# Patient Record
Sex: FEMALE | Race: WHITE | NOT HISPANIC OR LATINO | Employment: OTHER | URBAN - METROPOLITAN AREA
[De-identification: names, ages, dates, MRNs, and addresses within clinical notes are randomized per-mention and may not be internally consistent; named-entity substitution may affect disease eponyms.]

---

## 2019-06-12 ENCOUNTER — OFFICE VISIT - HEALTHEAST (OUTPATIENT)
Dept: PODIATRY | Facility: CLINIC | Age: 74
End: 2019-06-12

## 2019-06-12 DIAGNOSIS — L60.0 INGROWN TOENAIL: ICD-10-CM

## 2019-06-12 DIAGNOSIS — B35.1 NAIL FUNGUS: ICD-10-CM

## 2019-06-12 DIAGNOSIS — L60.2 ONYCHAUXIS: ICD-10-CM

## 2019-06-12 ASSESSMENT — MIFFLIN-ST. JEOR: SCORE: 1053.34

## 2021-05-25 ENCOUNTER — RECORDS - HEALTHEAST (OUTPATIENT)
Dept: ADMINISTRATIVE | Facility: CLINIC | Age: 76
End: 2021-05-25

## 2021-05-29 NOTE — PROGRESS NOTES
FOOT AND ANKLE SURGERY/PODIATRY CONSULT NOTE         ASSESSMENT:   Onychocryptosis second toe left foot  Onychomycosis  Onychauxis      TREATMENT:  Debrided the second toenail left foot        HPI: Carley Malloy is a pleasant 73-year-old female who presented to the clinic today complaining of a very painful ingrown toenail involving the outer portion of her second toenail left foot.  The patient stated this is quite tender when wearing shoes.  It is a sharp pain.  She has not had any trauma to the toe.  She has not had any redness or swelling.  There are no factors which relieve her pain.  She denies any other previous treatment.     History reviewed. No pertinent past medical history.    History reviewed. No pertinent surgical history.    Allergies   Allergen Reactions     Levofloxacin Hives     Hives also in mouth     Baclofen Nausea Only       No current outpatient medications on file.    History reviewed. No pertinent family history.    Social History     Socioeconomic History     Marital status:      Spouse name: Not on file     Number of children: Not on file     Years of education: Not on file     Highest education level: Not on file   Occupational History     Not on file   Social Needs     Financial resource strain: Not on file     Food insecurity:     Worry: Not on file     Inability: Not on file     Transportation needs:     Medical: Not on file     Non-medical: Not on file   Tobacco Use     Smoking status: Current Some Day Smoker     Types: Cigarettes     Smokeless tobacco: Never Used   Substance and Sexual Activity     Alcohol use: Yes     Frequency: 4 or more times a week     Drinks per session: 1 or 2     Binge frequency: Daily or almost daily     Drug use: Never     Sexual activity: Not on file   Lifestyle     Physical activity:     Days per week: Not on file     Minutes per session: Not on file     Stress: Not on file   Relationships     Social connections:     Talks on phone: Not on  file     Gets together: Not on file     Attends Latter day service: Not on file     Active member of club or organization: Not on file     Attends meetings of clubs or organizations: Not on file     Relationship status: Not on file     Intimate partner violence:     Fear of current or ex partner: Not on file     Emotionally abused: Not on file     Physically abused: Not on file     Forced sexual activity: Not on file   Other Topics Concern     Not on file   Social History Narrative     Not on file       Review of Systems - Patient denies fever, chills, rash, wound, stiffness, limping, numbness, weakness, heart burn, blood in stool, chest pain with activity, calf pain when walking, shortness of breath with activity, chronic cough, easy bleeding/bruising, swelling of ankles, excessive thirst, fatigue, depression, anxiety.  Patient admits to painful ingrown toenail second toe left foot.      OBJECTIVE:  Appearance: alert, well appearing, and in no distress.    Vitals:    06/12/19 1046   BP: 148/65   Pulse: 76   Resp: 18   Temp: 97.6  F (36.4  C)       BMI= Body mass index is 20.63 kg/m .    General appearance: Patient is alert and fully cooperative with history & exam.  No sign of distress is noted during the visit.  Psychiatric: Affect is pleasant & appropriate.  Patient appears motivated to improve health.  Respiratory: Breathing is regular & unlabored while sitting.  HEENT: Hearing is intact to spoken word.  Speech is clear.  No gross evidence of visual impairment that would impact ambulation.    Vascular: Dorsalis pedis and posterior tibial pulses are palpable. There is no is pedal hair growth bilaterally.  CFT < 3 sec from anterior tibial surface to distal digits bilaterally. There is no appreciable edema noted.  Dermatologic: Nails are normal length this date.  The lateral border of the second toenail left foot is severely incurvated.  There is pain on palpation along the lateral margin of the second toenail left  foot.  Turgor and texture are within normal limits. No coloration or temperature changes. No primary or secondary lesions noted.  Neurologic: All epicritic and proprioceptive sensations are grossly intact bilaterally.  Musculoskeletal: All active and passive ankle, subtalar, midtarsal, and 1st MPJ range of motion are grossly intact without pain or crepitus, with the exception of none. Manual muscle strength is normal limits bilaterally All dorsiflexors, plantarflexors, invertors, evertors are intact bilaterally. Tenderness present to lateral border left great toenail second toe on palpation.  No tenderness to second toe left foot with range of motion. Calf is soft/non-tender without warmth/induration    Imaging:     No results found.       TREATMENT:  Debrided the second toenail left foot and remove the offending nail border.  The patient is to return to the clinic as needed.    Kb Winters; LISA  [facility] Foot & Ankle Surgery/Podiatry

## 2021-06-02 VITALS — BODY MASS INDEX: 20.66 KG/M2 | WEIGHT: 124 LBS | HEIGHT: 65 IN

## 2022-03-23 ENCOUNTER — HOSPITAL ENCOUNTER (EMERGENCY)
Facility: CLINIC | Age: 77
Discharge: HOME | End: 2022-03-23
Attending: EMERGENCY MEDICINE | Admitting: EMERGENCY MEDICINE
Payer: COMMERCIAL

## 2022-03-23 VITALS
WEIGHT: 110.2 LBS | DIASTOLIC BLOOD PRESSURE: 56 MMHG | TEMPERATURE: 97.9 F | HEIGHT: 65 IN | OXYGEN SATURATION: 94 % | RESPIRATION RATE: 20 BRPM | SYSTOLIC BLOOD PRESSURE: 98 MMHG | HEART RATE: 74 BPM | BODY MASS INDEX: 18.36 KG/M2

## 2022-03-23 DIAGNOSIS — S81.812A SKIN TEAR OF LEFT LOWER LEG WITHOUT COMPLICATION, INITIAL ENCOUNTER: ICD-10-CM

## 2022-03-23 PROCEDURE — 99283 EMERGENCY DEPT VISIT LOW MDM: CPT

## 2022-03-23 PROCEDURE — 12002 RPR S/N/AX/GEN/TRNK2.6-7.5CM: CPT

## 2022-03-23 ASSESSMENT — ENCOUNTER SYMPTOMS
WEAKNESS: 0
FEVER: 0
FATIGUE: 0
CHILLS: 0
WOUND: 1

## 2022-03-24 NOTE — ED TRIAGE NOTES
Presents to the ED with C/O L lower shin laceration that occurred this morning around 0830 when pt ran into the  door     Unknown last tdap   Bleeding controlled

## 2022-03-24 NOTE — ED PROVIDER NOTES
EMERGENCY DEPARTMENT ENCOUNTER      NAME: Carley Malloy  AGE: 76 year old female  YOB: 1945  MRN: [id number]  EVALUATION DATE & TIME: 3/23/2022  8:57 PM    PCP: Darrel Cristobal    ED PROVIDER: Jeimy Riley DO      Chief Complaint   Patient presents with     Laceration         FINAL IMPRESSION:  1. Skin tear of left lower leg without complication, initial encounter          ED COURSE & MEDICAL DECISION MAKING:    Pertinent Labs & Imaging studies reviewed. (See chart for details)  9:11 PM I met the patient and performed my initial interview and exam. PPE: Provider wore gloves, and paper mask.    9:44 PM I rechecked and updated the patient. I discussed the plan for discharge with the patient, and patient is agreeable. We discussed supportive cares at home and reasons for return to the ER including new or worsening symptoms - all questions and concerns addressed. Patient to be discharged by RN.     76 year old female presents to the Emergency Department for evaluation of laceration of the left lower extremity.  Ports release morning, the  was down and she struck her leg.  She has been ambulatory.  Looked at the wound tonight and was concerned she may need stitches.  No bony pain.  Her last tetanus was in 2020.  The laceration is a skin tear with very friable skin, not amendable to stitching.  It was approximated with Steri-Strips per the resident physician.  Fairly good approximation.  Did discuss symptomatic care and return precautions.      At the conclusion of the encounter I discussed the results of all of the tests and the disposition. The questions were answered. The patient or family acknowledged understanding and was agreeable with the care plan.     MEDICATIONS GIVEN IN THE EMERGENCY:  Medications - No data to display    NEW PRESCRIPTIONS STARTED AT TODAY'S ER VISIT  There are no discharge medications for this patient.          =================================================================    HPI    Patient information was obtained from: the patient     Use of : N/A         Carley Malloy is a 76 year old female with no recorded pertinent history who presents to this ED via walk in for evaluation of a leg laceration.     The patient reports that this morning she buried her . She was walking around this morning after having a few glasses of wine, when she ran into the open  door around 0830 (~13 hours ago). This created a laceration on her left shin which has been wrapped since this morning.     Per Chart Review:   The patient's last tetanus shot was on 7/30/2020.    REVIEW OF SYSTEMS   Review of Systems   Constitutional: Negative for chills, fatigue and fever.   Skin: Positive for wound (laceration to left shin).   Allergic/Immunologic: Negative for immunocompromised state.   Neurological: Negative for syncope and weakness.        PAST MEDICAL HISTORY:  History reviewed. No pertinent past medical history.    PAST SURGICAL HISTORY:  History reviewed. No pertinent surgical history.        CURRENT MEDICATIONS:    No current outpatient medications on file.       ALLERGIES:  Allergies   Allergen Reactions     Levofloxacin Hives     Hives also in mouth     Baclofen Nausea       FAMILY HISTORY:  History reviewed. No pertinent family history.    SOCIAL HISTORY:   Social History     Socioeconomic History     Marital status:      Spouse name: Not on file     Number of children: Not on file     Years of education: Not on file     Highest education level: Not on file   Occupational History     Not on file   Tobacco Use     Smoking status: Current Some Day Smoker     Types: Cigarettes, Cigarettes     Smokeless tobacco: Never Used   Substance and Sexual Activity     Alcohol use: Yes     Drug use: Never     Sexual activity: Not on file   Other Topics Concern     Not on file   Social History Narrative      "Not on file     Social Determinants of Health     Financial Resource Strain: Not on file   Food Insecurity: Not on file   Transportation Needs: Not on file   Physical Activity: Not on file   Stress: Not on file   Social Connections: Not on file   Intimate Partner Violence: Not on file   Housing Stability: Not on file       VITALS:  BP 98/56   Pulse 74   Temp 97.9  F (36.6  C) (Oral)   Resp 20   Ht 1.651 m (5' 5\")   Wt 50 kg (110 lb 3.2 oz)   SpO2 94%   BMI 18.34 kg/m      PHYSICAL EXAM    Physical Exam  Vitals and nursing note reviewed.   Constitutional:       General: She is not in acute distress.     Appearance: Normal appearance.   HENT:      Head: Normocephalic and atraumatic.   Eyes:      Pupils: Pupils are equal, round, and reactive to light.   Cardiovascular:      Rate and Rhythm: Normal rate and regular rhythm.   Pulmonary:      Effort: Pulmonary effort is normal.   Abdominal:      General: Abdomen is flat.   Musculoskeletal:         General: Normal range of motion.      Left lower leg: Laceration present. No bony tenderness.      Comments: 6x3 cm skin tear to her left anterior shin.   Skin:     General: Skin is warm and dry.   Neurological:      General: No focal deficit present.      Mental Status: She is alert.      Gait: Gait normal.         PROCEDURES:     PROCEDURE: Laceration Repair   INDICATIONS: Laceration   PROCEDURE PROVIDER: Collin Beyer, DO, [facility] Family Medicine Resident   SITE: Left shin   TYPE/SIZE: simple, clean and no foreign body visualized  6 cm (total length)   FUNCTIONAL ASSESSMENT: Distal sensation, circulation and motor intact   MEDICATION: N/A   PREPARATION: irrigation with Normal saline   DEBRIDEMENT: no debridement   CLOSURE:  Wound was closed in   Steri-Strips    Total number of sutures/staples placed: 3             I, Aziza Saxena, am serving as a scribe to document services personally performed by Dr. Jeimy Riley based on my observation and the provider's " statements to me. I, Jeimy Riley DO attest that Aziza Saxena is acting in a scribe capacity, has observed my performance of the services and has documented them in accordance with my direction.    Jeimy Riley DO  Emergency Medicine  [facility] EMERGENCY ROOM  [address] [address]  [phone]  Dept: [phone]      Jeimy Riley DO  03/23/22 2241

## 2022-03-24 NOTE — DISCHARGE INSTRUCTIONS
I have placed steri strips on your skin tear. These will fall off on their own. Please change the OUTSIDE bandage tomorrow and let it dry out and continue to change the bandage once per day. Elevate your leg and take tylenol and/or ibuprofen for pain.

## 2022-03-24 NOTE — ED NOTES
AVS reviewed with pt and family. All questions answered. Pt to follow up as directed. Pt and family stated understanding. Pt left in stable condition.

## 2024-01-31 ENCOUNTER — OFFICE VISIT (OUTPATIENT)
Dept: FAMILY MEDICINE | Facility: CLINIC | Age: 79
End: 2024-01-31
Payer: COMMERCIAL

## 2024-01-31 VITALS
HEART RATE: 91 BPM | DIASTOLIC BLOOD PRESSURE: 70 MMHG | SYSTOLIC BLOOD PRESSURE: 121 MMHG | OXYGEN SATURATION: 95 % | RESPIRATION RATE: 16 BRPM | TEMPERATURE: 97.3 F

## 2024-01-31 DIAGNOSIS — T14.8XXA MULTIPLE SKIN TEARS: ICD-10-CM

## 2024-01-31 DIAGNOSIS — L03.115 CELLULITIS OF RIGHT LOWER EXTREMITY: Primary | ICD-10-CM

## 2024-01-31 PROCEDURE — 99203 OFFICE O/P NEW LOW 30 MIN: CPT | Performed by: FAMILY MEDICINE

## 2024-01-31 RX ORDER — ASPIRIN 81 MG/1
81 TABLET, CHEWABLE ORAL
COMMUNITY
Start: 2023-12-12

## 2024-01-31 RX ORDER — BECLOMETHASONE DIPROPIONATE HFA 80 UG/1
2 AEROSOL, METERED RESPIRATORY (INHALATION)
COMMUNITY
Start: 2023-12-12

## 2024-01-31 RX ORDER — LANOLIN ALCOHOL/MO/W.PET/CERES
CREAM (GRAM) TOPICAL
COMMUNITY
Start: 2023-11-17

## 2024-01-31 RX ORDER — MUPIROCIN 20 MG/G
OINTMENT TOPICAL 3 TIMES DAILY
Qty: 30 G | Refills: 0 | Status: SHIPPED | OUTPATIENT
Start: 2024-01-31

## 2024-01-31 RX ORDER — ATORVASTATIN CALCIUM 40 MG/1
40 TABLET, FILM COATED ORAL DAILY
COMMUNITY

## 2024-01-31 RX ORDER — CITALOPRAM HYDROBROMIDE 20 MG/1
20 TABLET ORAL AT BEDTIME
COMMUNITY

## 2024-01-31 RX ORDER — CEPHALEXIN 500 MG/1
500 CAPSULE ORAL 3 TIMES DAILY
Qty: 21 CAPSULE | Refills: 0 | Status: SHIPPED | OUTPATIENT
Start: 2024-01-31 | End: 2024-02-07

## 2024-01-31 NOTE — PATIENT INSTRUCTIONS
Continue wound care of shins    Cephalexin three times a day for 7 days   Take with food    Follow up if not improving.     Recommend looking into strength exercises for ankles and legs to reduce risk of falling.

## 2024-02-01 NOTE — PROGRESS NOTES
Assessment/Plan:   1. Cellulitis of right lower extremity  - cephALEXin (KEFLEX) 500 MG capsule; Take 1 capsule (500 mg) by mouth 3 times daily for 7 days  Dispense: 21 capsule; Refill: 0  - mupirocin (BACTROBAN) 2 % external ointment; Apply topically 3 times daily  Dispense: 30 g; Refill: 0  2. Multiple skin tears    Skin tears both shins 1-3 weeks old. There appears to be some cellulitis around the lesion on the right shin.   After examination the shin wounds were both redressed with a vaseline guaze, non-stick pad and coban wrap.   Plan:  Continue wound care of shins  Cephalexin three times a day for 7 days   Take with food  Follow up if not improving.   Recommend looking into strength exercises for ankles and legs to reduce risk of falling.     I discussed red flag symptoms, return precautions to clinic/ER and follow up care with patient/parent.  Expected clinical course, symptomatic cares advised. Questions answered. Patient/parent amenable with plan.      Subjective:     Carley Razo is a 78 year old female who presents for evaluation of a wound on her right lower leg.   About 10 days ago she tripped and bumped her shin into the ottoman. There was a skin tear and she cut off some of the flap that dried out and was sticking up. Mild redness and increasing soreness around the wound.   Less drainage but doesn't seem to be healing.   She has another wound on her left shin - a week or two older. The skin flap is well adhered but the edges still open/pink, no purulent drainage and pain is less.   No fever or systemic illness.   Baby aspirin daily.   She often doesn't lift her feet/toes enough to avoid tripping or catching things but has not had too many full falls. No head injuries.     Allergies   Allergen Reactions    Levofloxacin Hives     Hives also in mouth    Baclofen Nausea     Current Outpatient Medications   Medication    aspirin (ASA) 81 MG chewable tablet    atorvastatin (LIPITOR) 40 MG tablet     cephALEXin (KEFLEX) 500 MG capsule    citalopram (CELEXA) 20 MG tablet    cyanocobalamin (VITAMIN B-12) 1000 MCG tablet    mupirocin (BACTROBAN) 2 % external ointment    QVAR REDIHALER 80 MCG/ACT inhaler     No current facility-administered medications for this visit.     There is no problem list on file for this patient.      Objective:     /70   Pulse 91   Temp 97.3  F (36.3  C) (Oral)   Resp 16   SpO2 95%     Physical    General Appearance: Alert, pleasant, no distress, aVSS  Head: Normocephalic, without obvious abnormality, atraumatic  Eyes: Conjunctivae are normal.   Lungs:  respirations unlabored  Extremities: No lower extremity edema. There is a 2x3 oblong skin tear that is mostly open as the skin flap was cut off on the right shin with some surrounding redness, puffiness and tenderness. Warm. Minimal induration, no fluctuance. No purulent drainage.   There is an older skin tear that has an adherent skin flap covering most of it, no purulent drainage or surrounding cellulitis.    Skin: as above, there are also bruises on arms and legs diffusely, no other rashes or lesions  Psychiatric: Patient has a normal mood and affect.       This note has been dictated in part using voice recognition software.  Any grammatical or context distortions are unintentional and inherent to the software.  Please feel free to contact me directly for clarification if needed.

## 2025-01-14 ENCOUNTER — OFFICE VISIT (OUTPATIENT)
Dept: URGENT CARE | Facility: URGENT CARE | Age: 80
End: 2025-01-14
Payer: COMMERCIAL

## 2025-01-14 VITALS
OXYGEN SATURATION: 95 % | TEMPERATURE: 97.4 F | DIASTOLIC BLOOD PRESSURE: 72 MMHG | SYSTOLIC BLOOD PRESSURE: 119 MMHG | RESPIRATION RATE: 16 BRPM | HEART RATE: 75 BPM

## 2025-01-14 DIAGNOSIS — H61.22 IMPACTED CERUMEN OF LEFT EAR: Primary | ICD-10-CM

## 2025-01-14 PROCEDURE — 69209 REMOVE IMPACTED EAR WAX UNI: CPT | Mod: LT | Performed by: PHYSICIAN ASSISTANT

## 2025-01-14 NOTE — PROGRESS NOTES
Patient presents with:  Ear Problem: Ears feel plugged      Clinical Decision Making:  Cerumen lavage was performed in the left ear and the wax was removed.  Patient had improvement of hearing on the left side. Expected course of resolution and indication for return was gone over and questions were answered to patient/parent's satisfaction before discharge.        ICD-10-CM    1. Impacted cerumen of left ear  H61.22 OH REMOVAL IMPACTED CERUMEN IRRIGATION/LVG UNILAT            HPI:  Carley Razo is a 79 year old female who presents today for fullness and congestion on the left ear.  Patient states that the ear is felt plugged but there has been no otorrhea has had slight muffled hearing on the left side but no balance deficits.  No involvement of the right ear at this time.  No treatment was tried for this at home    History obtained from chart review and the patient.    Problem List:  There are no relevant problems documented for this patient.      No past medical history on file.    Social History     Tobacco Use    Smoking status: Some Days     Types: Cigarettes    Smokeless tobacco: Never   Substance Use Topics    Alcohol use: Yes       Review of Systems  As above in HPI otherwise negative.    Vitals:    01/14/25 1327   BP: 119/72   Pulse: 75   Resp: 16   Temp: 97.4  F (36.3  C)   TempSrc: Oral   SpO2: 95%       General: Patient is resting comfortably no acute distress is afebrile  HEENT: Head is normocephalic atraumatic   eyes are PERRL EOMI sclera anicteric   TMs on the left is obscured by cerumen.  TM on the right is without effusion or erythema  Throat is with mild pharyngeal wall erythema and no exudate  No cervical lymphadenopathy present  LUNGS: Clear to auscultation bilaterally  HEART: Regular rate and rhythm  Skin: Without rash non-diaphoretic    Physical Exam    Procedure Note:  Left EAC is impacted with cerumen. Cerumen lavage was performed.  TMs were visualized and intact. Patient tolerated  entire procedure well.    At the end of the encounter, I discussed results, diagnosis, medications. Discussed red flags for immediate return to clinic/ER, as well as indications for follow up if no improvement. Patient understood and agreed to plan. Patient was stable for discharge.

## 2025-05-30 ENCOUNTER — APPOINTMENT (OUTPATIENT)
Dept: CT IMAGING | Facility: CLINIC | Age: 80
End: 2025-05-30
Attending: EMERGENCY MEDICINE
Payer: COMMERCIAL

## 2025-05-30 ENCOUNTER — HOSPITAL ENCOUNTER (EMERGENCY)
Facility: CLINIC | Age: 80
Discharge: HOME | End: 2025-05-30
Attending: EMERGENCY MEDICINE | Admitting: EMERGENCY MEDICINE
Payer: COMMERCIAL

## 2025-05-30 VITALS
SYSTOLIC BLOOD PRESSURE: 115 MMHG | TEMPERATURE: 98.6 F | HEART RATE: 79 BPM | OXYGEN SATURATION: 89 % | BODY MASS INDEX: 20.49 KG/M2 | HEIGHT: 64 IN | DIASTOLIC BLOOD PRESSURE: 66 MMHG | WEIGHT: 120 LBS | RESPIRATION RATE: 20 BRPM

## 2025-05-30 DIAGNOSIS — R10.13 EPIGASTRIC PAIN: ICD-10-CM

## 2025-05-30 DIAGNOSIS — R93.3 ABNORMAL CT SCAN, STOMACH: ICD-10-CM

## 2025-05-30 LAB
ALBUMIN SERPL BCG-MCNC: 4 G/DL (ref 3.5–5.2)
ALP SERPL-CCNC: 87 U/L (ref 40–150)
ALT SERPL W P-5'-P-CCNC: 13 U/L (ref 0–50)
ANION GAP SERPL CALCULATED.3IONS-SCNC: 11 MMOL/L (ref 7–15)
AST SERPL W P-5'-P-CCNC: 22 U/L (ref 0–45)
ATRIAL RATE - MUSE: 96 BPM
BASOPHILS # BLD AUTO: 0.1 10E3/UL (ref 0–0.2)
BASOPHILS NFR BLD AUTO: 1 %
BILIRUB SERPL-MCNC: 0.7 MG/DL
BILIRUBIN DIRECT (ROCHE PRO & PURE): 0.31 MG/DL (ref 0–0.45)
BUN SERPL-MCNC: 11.3 MG/DL (ref 8–23)
CALCIUM SERPL-MCNC: 9.1 MG/DL (ref 8.8–10.4)
CHLORIDE SERPL-SCNC: 101 MMOL/L (ref 98–107)
CREAT SERPL-MCNC: 0.83 MG/DL (ref 0.51–0.95)
DIASTOLIC BLOOD PRESSURE - MUSE: NORMAL MMHG
EGFRCR SERPLBLD CKD-EPI 2021: 71 ML/MIN/1.73M2
EOSINOPHIL # BLD AUTO: 0.1 10E3/UL (ref 0–0.7)
EOSINOPHIL NFR BLD AUTO: 1 %
ERYTHROCYTE [DISTWIDTH] IN BLOOD BY AUTOMATED COUNT: 13 % (ref 10–15)
GLUCOSE SERPL-MCNC: 84 MG/DL (ref 70–99)
HCO3 SERPL-SCNC: 27 MMOL/L (ref 22–29)
HCT VFR BLD AUTO: 39.2 % (ref 35–47)
HGB BLD-MCNC: 12.8 G/DL (ref 11.7–15.7)
IMM GRANULOCYTES # BLD: 0 10E3/UL
IMM GRANULOCYTES NFR BLD: 0 %
INTERPRETATION ECG - MUSE: NORMAL
LACTATE SERPL-SCNC: 1.2 MMOL/L (ref 0.7–2)
LIPASE SERPL-CCNC: 19 U/L (ref 13–60)
LYMPHOCYTES # BLD AUTO: 1.6 10E3/UL (ref 0.8–5.3)
LYMPHOCYTES NFR BLD AUTO: 20 %
MCH RBC QN AUTO: 34.3 PG (ref 26.5–33)
MCHC RBC AUTO-ENTMCNC: 32.7 G/DL (ref 31.5–36.5)
MCV RBC AUTO: 105 FL (ref 78–100)
MONOCYTES # BLD AUTO: 0.5 10E3/UL (ref 0–1.3)
MONOCYTES NFR BLD AUTO: 7 %
NEUTROPHILS # BLD AUTO: 5.6 10E3/UL (ref 1.6–8.3)
NEUTROPHILS NFR BLD AUTO: 70 %
NRBC # BLD AUTO: 0 10E3/UL
NRBC BLD AUTO-RTO: 0 /100
P AXIS - MUSE: 87 DEGREES
PLATELET # BLD AUTO: 177 10E3/UL (ref 150–450)
POTASSIUM SERPL-SCNC: 4.3 MMOL/L (ref 3.4–5.3)
PR INTERVAL - MUSE: 134 MS
PROT SERPL-MCNC: 6.1 G/DL (ref 6.4–8.3)
QRS DURATION - MUSE: 76 MS
QT - MUSE: 364 MS
QTC - MUSE: 459 MS
R AXIS - MUSE: 87 DEGREES
RBC # BLD AUTO: 3.73 10E6/UL (ref 3.8–5.2)
SODIUM SERPL-SCNC: 139 MMOL/L (ref 135–145)
SYSTOLIC BLOOD PRESSURE - MUSE: NORMAL MMHG
T AXIS - MUSE: 87 DEGREES
TROPONIN T SERPL HS-MCNC: 12 NG/L
TROPONIN T SERPL HS-MCNC: 14 NG/L
VENTRICULAR RATE- MUSE: 96 BPM
WBC # BLD AUTO: 8 10E3/UL (ref 4–11)

## 2025-05-30 PROCEDURE — 96374 THER/PROPH/DIAG INJ IV PUSH: CPT | Mod: 59

## 2025-05-30 PROCEDURE — 99285 EMERGENCY DEPT VISIT HI MDM: CPT | Mod: 25

## 2025-05-30 PROCEDURE — 80053 COMPREHEN METABOLIC PANEL: CPT | Performed by: STUDENT IN AN ORGANIZED HEALTH CARE EDUCATION/TRAINING PROGRAM

## 2025-05-30 PROCEDURE — 258N000003 HC RX IP 258 OP 636: Performed by: EMERGENCY MEDICINE

## 2025-05-30 PROCEDURE — 96361 HYDRATE IV INFUSION ADD-ON: CPT

## 2025-05-30 PROCEDURE — 83690 ASSAY OF LIPASE: CPT | Performed by: EMERGENCY MEDICINE

## 2025-05-30 PROCEDURE — 250N000011 HC RX IP 250 OP 636: Performed by: EMERGENCY MEDICINE

## 2025-05-30 PROCEDURE — 82248 BILIRUBIN DIRECT: CPT | Performed by: EMERGENCY MEDICINE

## 2025-05-30 PROCEDURE — 96375 TX/PRO/DX INJ NEW DRUG ADDON: CPT

## 2025-05-30 PROCEDURE — 85025 COMPLETE CBC W/AUTO DIFF WBC: CPT | Performed by: STUDENT IN AN ORGANIZED HEALTH CARE EDUCATION/TRAINING PROGRAM

## 2025-05-30 PROCEDURE — 36415 COLL VENOUS BLD VENIPUNCTURE: CPT | Performed by: EMERGENCY MEDICINE

## 2025-05-30 PROCEDURE — 36415 COLL VENOUS BLD VENIPUNCTURE: CPT | Performed by: STUDENT IN AN ORGANIZED HEALTH CARE EDUCATION/TRAINING PROGRAM

## 2025-05-30 PROCEDURE — 84484 ASSAY OF TROPONIN QUANT: CPT | Performed by: STUDENT IN AN ORGANIZED HEALTH CARE EDUCATION/TRAINING PROGRAM

## 2025-05-30 PROCEDURE — 93005 ELECTROCARDIOGRAM TRACING: CPT | Performed by: STUDENT IN AN ORGANIZED HEALTH CARE EDUCATION/TRAINING PROGRAM

## 2025-05-30 PROCEDURE — 83605 ASSAY OF LACTIC ACID: CPT | Performed by: EMERGENCY MEDICINE

## 2025-05-30 PROCEDURE — 71275 CT ANGIOGRAPHY CHEST: CPT

## 2025-05-30 RX ORDER — MORPHINE SULFATE 2 MG/ML
2 INJECTION, SOLUTION INTRAMUSCULAR; INTRAVENOUS ONCE
Status: COMPLETED | OUTPATIENT
Start: 2025-05-30 | End: 2025-05-30

## 2025-05-30 RX ORDER — OMEPRAZOLE 20 MG/1
TABLET, DELAYED RELEASE ORAL
Qty: 45 TABLET | Refills: 0 | Status: SHIPPED | OUTPATIENT
Start: 2025-05-30

## 2025-05-30 RX ORDER — SODIUM CHLORIDE 9 MG/ML
INJECTION, SOLUTION INTRAVENOUS CONTINUOUS
Status: DISCONTINUED | OUTPATIENT
Start: 2025-05-30 | End: 2025-05-30 | Stop reason: HOSPADM

## 2025-05-30 RX ORDER — IOPAMIDOL 755 MG/ML
100 INJECTION, SOLUTION INTRAVASCULAR ONCE
Status: COMPLETED | OUTPATIENT
Start: 2025-05-30 | End: 2025-05-30

## 2025-05-30 RX ORDER — SUCRALFATE ORAL 1 G/10ML
1 SUSPENSION ORAL 4 TIMES DAILY
Qty: 473 ML | Refills: 0 | Status: SHIPPED | OUTPATIENT
Start: 2025-05-30

## 2025-05-30 RX ADMIN — PANTOPRAZOLE SODIUM 40 MG: 40 INJECTION, POWDER, LYOPHILIZED, FOR SOLUTION INTRAVENOUS at 10:56

## 2025-05-30 RX ADMIN — PROCHLORPERAZINE EDISYLATE 5 MG: 5 INJECTION INTRAMUSCULAR; INTRAVENOUS at 10:56

## 2025-05-30 RX ADMIN — SODIUM CHLORIDE: 0.9 INJECTION, SOLUTION INTRAVENOUS at 10:56

## 2025-05-30 RX ADMIN — IOPAMIDOL 90 ML: 755 INJECTION, SOLUTION INTRAVENOUS at 12:08

## 2025-05-30 ASSESSMENT — COLUMBIA-SUICIDE SEVERITY RATING SCALE - C-SSRS
2. HAVE YOU ACTUALLY HAD ANY THOUGHTS OF KILLING YOURSELF IN THE PAST MONTH?: NO
1. IN THE PAST MONTH, HAVE YOU WISHED YOU WERE DEAD OR WISHED YOU COULD GO TO SLEEP AND NOT WAKE UP?: NO
6. HAVE YOU EVER DONE ANYTHING, STARTED TO DO ANYTHING, OR PREPARED TO DO ANYTHING TO END YOUR LIFE?: NO

## 2025-05-30 ASSESSMENT — ACTIVITIES OF DAILY LIVING (ADL)
ADLS_ACUITY_SCORE: 41

## 2025-05-30 NOTE — DISCHARGE INSTRUCTIONS
So, this inflammation of the part of your stomach can be from an ulcer, a cancer, or just general inflammation of the stomach.  The initial treatment is to start you on antacids which I have prescribed you will take those twice a day for 2 weeks and then you can decrease it to once a day if you are doing well.  You will also take Carafate to coat the stomach.    I am putting in a referral for gastroenterology like we talked about because due to your smoking history you do need to have someone look down your throat to the stomach to see why it is inflamed in that spot to make sure that there is no cancer and if there is an ulcer if it they can aid in healing/treating it.    While waiting for that appointment I would set up a follow-up with your primary care physician in about a week to see how you are improving.  Return for bleeding or recurrent throwing up or uncontrolled pain.    Avoid acidic foods like citrus, tomatoes, hot/spicy, coffee, alcohol and avoid ibuprofen/acetaminophen/aleve/aspirin until this heals.    If you don't hear from the  within 1 business day, then call [facility] yourself to let them know we put in a referral and want you seen within the month.  If you see [facility] they often also use their own gastroenterologist and may want you to see their own so speak with your clinic as well.    Stop smoking as you do have emphysema seen on your cat scan and it is causing you to always have lower than normal oxygen levels.  This will keep worsening if you continue to smoke.  If you stop it can improve slightly but will never fully heal.

## 2025-05-30 NOTE — ED NOTES
Introduced self to patient.  Whiteboard updated.  Pain assessed.  Plan of care and length of time discussed with patient.  Will continue to monitor.  Kamala Ruvaclaba RN.......5/30/2025 10:45 AM

## 2025-05-30 NOTE — ED TRIAGE NOTES
Patient states she had had intermittent mid sternal chest pain, for 3 days.      Triage Assessment (Adult)       Row Name 05/30/25 0943          Triage Assessment    Airway WDL WDL        Respiratory WDL    Respiratory WDL WDL        Skin Circulation/Temperature WDL    Skin Circulation/Temperature WDL WDL        Cardiac WDL    Cardiac WDL chest pain        Chest Pain Assessment    Chest Pain Location midsternal     Duration intermittent     Precipitating Factors nothing     Alleviating Factors nothing     Chest Pain Intervention 12-lead ECG obtained        Peripheral/Neurovascular WDL    Peripheral Neurovascular WDL WDL        Cognitive/Neuro/Behavioral WDL    Cognitive/Neuro/Behavioral WDL WDL

## 2025-05-30 NOTE — ED PROVIDER NOTES
Emergency Department Encounter     Evaluation Date & Time:   No admission date for patient encounter.    CHIEF COMPLAINT:  Chest Pain      Triage Note:Patient states she had had intermittent mid sternal chest pain, for 3 days.      Triage Assessment (Adult)       Row Name 05/30/25 0943          Triage Assessment    Airway WDL WDL        Respiratory WDL    Respiratory WDL WDL        Skin Circulation/Temperature WDL    Skin Circulation/Temperature WDL WDL        Cardiac WDL    Cardiac WDL chest pain        Chest Pain Assessment    Chest Pain Location midsternal     Duration intermittent     Precipitating Factors nothing     Alleviating Factors nothing     Chest Pain Intervention 12-lead ECG obtained        Peripheral/Neurovascular WDL    Peripheral Neurovascular WDL WDL        Cognitive/Neuro/Behavioral WDL    Cognitive/Neuro/Behavioral WDL WDL                         FINAL IMPRESSION:    ICD-10-CM    1. Abnormal CT scan, stomach  R93.3 Adult GI  Referral - Consult Only     Adult GI  Referral - Procedure Only      2. Epigastric pain  R10.13           Impression and Plan     ED COURSE & MEDICAL DECISION MAKING:  10:12 AM I met with the patient to obtain patient history and performed a physical exam. Discussed plan for ED work up including potential diagnostic studies and interventions.      ED Course as of 05/30/25 1410   Fri May 30, 2025   1058 Patient has epigastric pain on examination.  She is a smoker making her higher risk for dissection and/or aneurysm.  She also drinks a moderate to heavy amount every day.  Certainly no signs of withdrawal but she is therefore at higher risk for things like peptic ulcer disease, gastritis, pancreatitis.  She states she had her gallbladder removed for porcelain gallbladder so we should not be dealing with things like biliary stones causing obstructive pattern.  Certainly also at risk for a mass with obstruction.  At this point we will start treatment with  Compazine and then move onto IV pain medication once she is in her room.  Look for signs of ischemia certainly with the smoking.  Will do EKG and troponin to rule out cardiac equivalent.  Otherwise, CT scan I think is appropriate here.  Further imaging and/or testing will be based on this initial round of tests as the differential is obviously not limited to the above.   1315 There is no evidence of pancreatitis either on blood work or imaging, no aneurysm.  I reviewed the radiology result and the images and agree with radiology read.  There are some thickening of the distal stomach.  This could be developing ulcer or gastritis but certainly in a smoker cannot rule out underlying cancer.  Recommend close follow-up with her primary care physician and then consider outpatient GI referral for possible endoscopy for final evaluation of this problem.  In the meantime we will start on twice daily PPI.  No stool here to be able to test for H. pylori but certainly should be considered in the outpatient setting.  Also carafate for pain and to treat.   1407 I discussed multiple things with the patient.  First and foremost she has emphysema and if she continues to smoke this will continue to worsen so was given smoking cessation counseling.  Her hypoxia is due to this there is no other cause and she is not bothered by it here indicating chronicity.  Secondarily we discussed the abnormal CT of her stomach and the need for follow-up endoscopy.  She will set up follow-up in [facility] here in [place].  On discharge I did then see that she goes to [facility] clinic so I did advise her to also speak with them as they often follow-up within their own gastroenterologist.  Otherwise in the meantime she will do PPI and Carafate.  We discussed reasons to return and food alcohol smoking to avoid as well as avoid aspirin.  She is not on aspirin for any recent stenting or any history of known coronary artery  disease so we will have her hold that until this heals.    Follow up also with her pmd in 1 week.       At the conclusion of the encounter I discussed the results of all the tests and the disposition. The questions were answered. The patient or family acknowledged understanding and was agreeable with the care plan.          0 minutes of critical care time        MEDICATIONS GIVEN IN THE EMERGENCY DEPARTMENT:  Medications   sodium chloride 0.9 % infusion ( Intravenous $New Bag 5/30/25 1056)   morphine (PF) injection 2 mg (has no administration in time range)   prochlorperazine (COMPAZINE) injection 5 mg (5 mg Intravenous $Given 5/30/25 1056)   pantoprazole (PROTONIX) IV push injection 40 mg (40 mg Intravenous $Given 5/30/25 1056)   iopamidol (ISOVUE-370) solution 100 mL (90 mLs Intravenous $Given 5/30/25 1208)       NEW PRESCRIPTIONS STARTED AT TODAY'S ED VISIT:  New Prescriptions    OMEPRAZOLE (PRILOSEC OTC) 20 MG EC TABLET    Take bid x 14d, then can decrease to once daily    SUCRALFATE (CARAFATE) 1 GM/10ML SUSPENSION    Take 10 mLs (1 g) by mouth 4 times daily.       HPI     HPI     Carley Razo is a 79 year old female with a pertinent history of COPD, tobacco use disorder, alcohol use disorder, cholecystectomy, PAD, HLD, who presents to this ED via private vehicle with daughter for evaluation of chest pain. History from patient primarily.  Daughter agrees with mother's history, notes nothing to add.    Patient reports she woke up yesterday morning with persistent mid sternal chest pain. Describes the sensation as occasionally sharp but mostly an uncomfortable sensation. The chest pain has continued to persist into today which prompted her to come into the ED. She associates nausea, chills, fatigue, epigastric abdominal pain, and decrease in appetite. She denies history of peptic ulcers but does note periodic heartburn at night. She denies recent excessive ibuprofen or tylenol use. Does take a baby  "aspirin daily. Endorses history of cholecystectomy. Otherwise denies associating vomiting, change in bowel habits, fever, and shortness of breath. There were no other concerns/complaints at this time.    Shx: She is a smoker. Does endorse regular alcohol use use daily (\"couple of drinks\").  She states she has never been told it is a problem.    REVIEW OF SYSTEMS:  Review of Systems  remainder of systems are all otherwise negative.        Medical History     History reviewed. No pertinent past medical history.  Per patient she has no daily chronic medical problems, just previous surgery for porcelain gallbladder and gallstones with cholecystectomy            Social History     Tobacco Use    Smoking status: Some Days     Types: Cigarettes    Smokeless tobacco: Never   Substance Use Topics    Alcohol use: Yes    Drug use: Never       omeprazole (PRILOSEC OTC) 20 MG EC tablet  sucralfate (CARAFATE) 1 GM/10ML suspension  aspirin (ASA) 81 MG chewable tablet  atorvastatin (LIPITOR) 40 MG tablet  citalopram (CELEXA) 20 MG tablet  cyanocobalamin (VITAMIN B-12) 1000 MCG tablet  mupirocin (BACTROBAN) 2 % external ointment  QVAR REDIHALER 80 MCG/ACT inhaler        Physical Exam     First Vitals:  Patient Vitals for the past 24 hrs:   BP Temp Temp src Pulse Resp SpO2 Height Weight   05/30/25 1108 -- -- -- 80 21 95 % -- --   05/30/25 1106 -- -- -- 75 19 (!) 91 % -- --   05/30/25 1104 -- -- -- 76 22 (!) 89 % -- --   05/30/25 1100 113/56 -- -- -- -- -- -- --   05/30/25 0946 121/55 98.6  F (37  C) Oral 99 18 92 % 1.626 m (5' 4\") 54.4 kg (120 lb)       PHYSICAL EXAM:   Constitutional:  Easily conversable. No acute distress.   HENT:  Normocephalic, posterior pharynx wnl, mucous membranes moist and dark pink   Eyes:  PERRL, EOMI, Conjunctiva normal, No discharge, no scleral icterus.  Respiratory:  Breathing easily, slight rhonchi at bases but clears with inspiration, occasional cough.   Cardiovascular:  Regular rate and rhythm. nl " s1s2 0 murmurs, rubs, or gallops.  Peripheral pulses dp, pt, and radial are wnl. No peripheral edema   GI:  Bowel sounds normal, Soft, epigastric tenderness, No flank tenderness, nondistended.  :No CVA tenderness.   Musculoskeletal:  Moves all extremities.  No erythematous or swollen major joints,   Integument:  Color normal.  Neurologic:  Alert & oriented x 3, Normal motor function, Normal sensory function, No focal deficits noted. Normal speech.  Psychiatric:  Affect normal, Judgment normal, Mood normal.     Results     LAB AND RADIOLOGY:  All pertinent labs reviewed and interpreted  Results for orders placed or performed during the hospital encounter of 05/30/25   CTA Chest Abdomen Pelvis w Contrast     Status: None    Narrative    EXAM: CTA CHEST ABDOMEN PELVIS W CONTRAST  LOCATION: [facility]  DATE: 5/30/2025    INDICATION: epigastric pain.  smoker.  moderate to heavy etoh use.  dissection protocol and r o pancreatitis pud  COMPARISON: None.  TECHNIQUE: CT angiogram chest abdomen pelvis during arterial phase of injection of IV contrast. 2D and 3D MIP reconstructions were performed by the CT technologist. Dose reduction techniques were used.   CONTRAST: Isovue 370 90mL    FINDINGS:   CT ANGIOGRAM CHEST, ABDOMEN, AND PELVIS: No aneurysm, dissection, intramural hematoma, or penetrating atheromatous ulcer. There is atherosclerotic disease. There is slightly greater than 50% vessel diameter narrowing of the proximal left common iliac   artery due to soft and calcified plaque. No PE.    LUNGS AND PLEURA: Partially calcified apical scarring noted. Emphysema is present. There is a small amount of debris in the airways. There is a partially calcified left upper lobe nodule measuring 4 mm. There is an ill-defined 5 mm x 4 mm groundglass   right upper lobe nodule (series 7 image 69).    MEDIASTINUM/AXILLAE: Normal.    CORONARY ARTERY CALCIFICATION: Mild.    HEPATOBILIARY: Cholecystectomy. There  is mild extra hepatic bile duct dilatation which is likely from cholecystectomy.    PANCREAS: Normal.    SPLEEN: Normal.    ADRENAL GLANDS: Normal.    KIDNEYS/BLADDER: Normal.    BOWEL: Colonic diverticulosis is present. Normal appendix. No distended loops of bowel. There is mucosal enhancement and wall thickening of the distal stomach.    LYMPH NODES: Normal.    PELVIC ORGANS: Normal.    MUSCULOSKELETAL: Normal.        Impression    IMPRESSION:  1.  No acute aortic syndrome. No PE. There is atherosclerotic disease with 50-70% vessel diameter narrowing involving the proximal left common iliac artery.  2.  Mucosal enhancement and wall thickening of the distal stomach could be seen with gastritis or underdistention.  3.  Emphysema.  4.  A 5 mm right upper lobe nodule is indeterminate. Recommend follow-up chest CT in 3-6 months if prior imaging is not available      REFERENCE:  Guidelines for Management of Incidental Pulmonary Nodules Detected on CT Images: From the Fleischner Society 2017.   Basic metabolic panel     Status: Normal   Result Value Ref Range    Sodium 139 135 - 145 mmol/L    Potassium 4.3 3.4 - 5.3 mmol/L    Chloride 101 98 - 107 mmol/L    Carbon Dioxide (CO2) 27 22 - 29 mmol/L    Anion Gap 11 7 - 15 mmol/L    Urea Nitrogen 11.3 8.0 - 23.0 mg/dL    Creatinine 0.83 0.51 - 0.95 mg/dL    GFR Estimate 71 >60 mL/min/1.73m2    Calcium 9.1 8.8 - 10.4 mg/dL    Glucose 84 70 - 99 mg/dL   Troponin T, High Sensitivity     Status: Normal   Result Value Ref Range    Troponin T, High Sensitivity 14 <=14 ng/L   Hepatic function panel     Status: Abnormal   Result Value Ref Range    Protein Total 6.1 (L) 6.4 - 8.3 g/dL    Albumin 4.0 3.5 - 5.2 g/dL    Bilirubin Total 0.7 <=1.2 mg/dL    Alkaline Phosphatase 87 40 - 150 U/L    AST 22 0 - 45 U/L    ALT 13 0 - 50 U/L    Bilirubin Direct 0.31 0.00 - 0.45 mg/dL   Lipase     Status: Normal   Result Value Ref Range    Lipase 19 13 - 60 U/L   Lactic acid whole blood with 1x  repeat in 2 hr when >2     Status: Normal   Result Value Ref Range    Lactic Acid, Initial 1.2 0.7 - 2.0 mmol/L   CBC with platelets and differential     Status: Abnormal   Result Value Ref Range    WBC Count 8.0 4.0 - 11.0 10e3/uL    RBC Count 3.73 (L) 3.80 - 5.20 10e6/uL    Hemoglobin 12.8 11.7 - 15.7 g/dL    Hematocrit 39.2 35.0 - 47.0 %     (H) 78 - 100 fL    MCH 34.3 (H) 26.5 - 33.0 pg    MCHC 32.7 31.5 - 36.5 g/dL    RDW 13.0 10.0 - 15.0 %    Platelet Count 177 150 - 450 10e3/uL    % Neutrophils 70 %    % Lymphocytes 20 %    % Monocytes 7 %    % Eosinophils 1 %    % Basophils 1 %    % Immature Granulocytes 0 %    NRBCs per 100 WBC 0 <1 /100    Absolute Neutrophils 5.6 1.6 - 8.3 10e3/uL    Absolute Lymphocytes 1.6 0.8 - 5.3 10e3/uL    Absolute Monocytes 0.5 0.0 - 1.3 10e3/uL    Absolute Eosinophils 0.1 0.0 - 0.7 10e3/uL    Absolute Basophils 0.1 0.0 - 0.2 10e3/uL    Absolute Immature Granulocytes 0.0 <=0.4 10e3/uL    Absolute NRBCs 0.0 10e3/uL   Troponin T, High Sensitivity     Status: Normal   Result Value Ref Range    Troponin T, High Sensitivity 12 <=14 ng/L   CBC with platelets differential     Status: Abnormal    Narrative    The following orders were created for panel order CBC with platelets differential.  Procedure                               Abnormality         Status                     ---------                               -----------         ------                     CBC with platelets and ...[[id number]]  Abnormal            Final result                 Please view results for these tests on the individual orders.         ECG:    Performed at: 5/30/2025 09:40:34    Impression: Sinus rhythm, Biatrial enlargement, Borderline ECG    Rate: 96  Rhythm: Sinus rhythm  Axis: 87  PA Interval: 134  QRS Interval: 76  QTc Interval: 459  ST Changes: None  Comparison: No previous ECG for comparison    I have independently reviewed and interpreted the EKS(s) documented  above    PROCEDURES:  Procedures:      [facility] Documentation     Medical Decision Making    Discharge. I prescribed additional prescription strength medication(s) as charted. See documentation for any additional details.    MIPS (CTPE, Dental pain, Sorto, Sinusitis, Asthma/COPD, Head Trauma): Asthma or COPD Exacerbation:Smoking Cessation Counseling: Patient is a current smoker and received smoking cessation instructions/education at discharge.     SEPSIS: None    The creation of this record is based on the scribe s observations of the work being performed by Maral Dial and the provider s statements to them. This document has been checked and approved by MD Holly Sanchez MD  Emergency Medicine  [facility] EMERGENCY ROOM         Holly Tellez MD  05/30/25 1527